# Patient Record
Sex: MALE | Race: NATIVE HAWAIIAN OR OTHER PACIFIC ISLANDER | NOT HISPANIC OR LATINO | URBAN - METROPOLITAN AREA
[De-identification: names, ages, dates, MRNs, and addresses within clinical notes are randomized per-mention and may not be internally consistent; named-entity substitution may affect disease eponyms.]

---

## 2020-03-20 ENCOUNTER — EMERGENCY (EMERGENCY)
Facility: HOSPITAL | Age: 35
LOS: 1 days | Discharge: HOME | End: 2020-03-20
Admitting: EMERGENCY MEDICINE
Payer: COMMERCIAL

## 2020-03-20 VITALS
HEART RATE: 74 BPM | TEMPERATURE: 98 F | RESPIRATION RATE: 18 BRPM | DIASTOLIC BLOOD PRESSURE: 82 MMHG | WEIGHT: 154.98 LBS | HEIGHT: 71 IN | SYSTOLIC BLOOD PRESSURE: 121 MMHG | OXYGEN SATURATION: 98 %

## 2020-03-20 DIAGNOSIS — B34.9 VIRAL INFECTION, UNSPECIFIED: ICD-10-CM

## 2020-03-20 DIAGNOSIS — R05 COUGH: ICD-10-CM

## 2020-03-20 DIAGNOSIS — Z20.828 CONTACT WITH AND (SUSPECTED) EXPOSURE TO OTHER VIRAL COMMUNICABLE DISEASES: ICD-10-CM

## 2020-03-20 LAB
RAPID RVP RESULT: DETECTED
RV+EV RNA SPEC QL NAA+PROBE: DETECTED

## 2020-03-20 PROCEDURE — 87798 DETECT AGENT NOS DNA AMP: CPT

## 2020-03-20 PROCEDURE — 99284 EMERGENCY DEPT VISIT MOD MDM: CPT

## 2020-03-20 PROCEDURE — 87486 CHLMYD PNEUM DNA AMP PROBE: CPT

## 2020-03-20 PROCEDURE — 87581 M.PNEUMON DNA AMP PROBE: CPT

## 2020-03-20 PROCEDURE — 87633 RESP VIRUS 12-25 TARGETS: CPT

## 2020-03-20 PROCEDURE — 87635 SARS-COV-2 COVID-19 AMP PRB: CPT

## 2020-03-20 NOTE — ED PROVIDER NOTE - CLINICAL SUMMARY MEDICAL DECISION MAKING FREE TEXT BOX
cough, nasal congestion, diarrhea x 5 days with COVID positive contact. pt well appearing. VSS. afebrile. covid and rvp sent. return precautions d/w pt.  self isolated and f/u with pmd

## 2020-03-20 NOTE — ED PROVIDER NOTE - PATIENT PORTAL LINK FT
You can access the FollowMyHealth Patient Portal offered by [facility] by registering at the following website: [email or web address]. By joining [employer]’s FollowMyHealth portal, you will also be able to view your health information using other applications (apps) compatible with our system.

## 2020-03-20 NOTE — ED PROVIDER NOTE - OBJECTIVE STATEMENT
35 y/o male with no sig PMHx c/o cough  x 5 days. pt states non productive cough, nasal congestion, body aches and diarrhea past 5 days. pt reports co resident recently tested positive for COVID and they were together all last week. no abd pain n/v. no ha or dizziness. no pc or sob. no further complaints.

## 2020-03-20 NOTE — ED ADULT TRIAGE NOTE - CHIEF COMPLAINT QUOTE
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  C/O of sore throat, cough, diarrhea, nausea, general malaise, subjective fevers and chills since Monday.

## 2020-03-20 NOTE — ED PROVIDER NOTE - NSFOLLOWUPINSTRUCTIONS_ED_ALL_ED_FT
call your physician in 2 days. please self isolate for the next 14 days. refer to COVID discharge instructions provided.       Log Out.    [employer]® CareNotes®     :  [facility]             UPPER RESPIRATORY INFECTION - AfterCare(R) Instructions(ER/ED)     Upper Respiratory Infection    WHAT YOU NEED TO KNOW:    An upper respiratory infection is also called the common cold. It is an infection that can affect your nose, throat, ears, and sinuses. For healthy people, the common cold is usually not serious and does not need special treatment. Cold symptoms are usually worst for the first 3 to 5 days. Most people get better in 7 to 14 days. You may continue to cough for 2 to 3 weeks. Colds are caused by viruses and do not get better with antibiotics.     DISCHARGE INSTRUCTIONS:    Return to the emergency department if:     You have chest pain or trouble breathing.        Contact your healthcare provider if:     You have a fever over 102ºF (39°C).      Your sore throat gets worse or you see white or yellow spots in your throat.      Your symptoms get worse after 3 to 5 days or your cold is not better in 14 days.      You have a rash anywhere on your skin.      You have large, tender lumps in your neck.      You have thick, green or yellow drainage from your nose.      You cough up thick yellow, green, or bloody mucus.      You have vomiting for more than 24 hours and cannot keep fluids down.      You have a bad earache.      You have questions or concerns about your condition or care.    Medicines: You may need any of the following:     Decongestants help reduce nasal congestion and help you breathe more easily. If you take decongestant pills, they may make you feel restless or cause problems with your sleep. Do not use decongestant sprays for more than a few days.      Cough suppressants help reduce coughing. Ask your healthcare provider which type of cough medicine is best for you.       NSAIDs, such as ibuprofen, help decrease swelling, pain, and fever. NSAIDs can cause stomach bleeding or kidney problems in certain people. If you take blood thinner medicine, always ask your healthcare provider if NSAIDs are safe for you. Always read the medicine label and follow directions.      Acetaminophen decreases pain and fever. It is available without a doctor's order. Ask how much to take and how often to take it. Follow directions. Read the labels of all other medicines you are using to see if they also contain acetaminophen, or ask your doctor or pharmacist. Acetaminophen can cause liver damage if not taken correctly. Do not use more than 4 grams (4,000 milligrams) total of acetaminophen in one day.       Take your medicine as directed. Contact your healthcare provider if you think your medicine is not helping or if you have side effects. Tell him or her if you are allergic to any medicine. Keep a list of the medicines, vitamins, and herbs you take. Include the amounts, and when and why you take them. Bring the list or the pill bottles to follow-up visits. Carry your medicine list with you in case of an emergency.    Follow up with your healthcare provider as directed: Write down your questions so you remember to ask them during your visits.     Self-care:     Rest as much as possible. Slowly start to do more each day.       Drink more liquids as directed. Liquids will help thin and loosen mucus so you can cough it up. Liquids will also help prevent dehydration. Liquids that help prevent dehydration include water, fruit juice, and broth. Do not drink liquids that contain caffeine. Caffeine can increase your risk for dehydration. Ask your healthcare provider how much liquid to drink each day.       Soothe a sore throat. Gargle with warm salt water. This helps your sore throat feel better. Make salt water by dissolving ¼ teaspoon salt in 1 cup warm water. You may also suck on hard candy or throat lozenges. You may use a sore throat spray.      Use a humidifier or vaporizer. Use a cool mist humidifier or a vaporizer to increase air moisture in your home. This may make it easier for you to breathe and help decrease your cough.       Use saline nasal drops as directed. These help relieve congestion.       Apply petroleum-based jelly around the outside of your nostrils. This can decrease irritation from blowing your nose.       Do not smoke. Nicotine and other chemicals in cigarettes and cigars can make your symptoms worse. They can also cause infections such as bronchitis or pneumonia. Ask your healthcare provider for information if you currently smoke and need help to quit. E-cigarettes or smokeless tobacco still contain nicotine. Talk to your healthcare provider before you use these products.     Prevent spreading your cold to others:     Try to stay away from other people during the first 2 to 3 days of your cold when it is more easily spread.       Do not share food or drinks.       Do not share hand towels with household members.      Wash your hands often, especially after you blow your nose. Turn away from other people and cover your mouth and nose with a tissue when you sneeze or cough.Handwashing              © Copyright [employer] 2020       back to top                      © Copyright [employer] 2020

## 2020-03-20 NOTE — ED ADULT NURSE NOTE - CHIEF COMPLAINT QUOTE
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  C/O of sore throat, cough, diarrhea, nausea, general malaise, subjective fevers and chills since Monday.

## 2020-03-22 LAB — SARS-COV-2 RNA SPEC QL NAA+PROBE: SIGNIFICANT CHANGE UP

## 2020-04-26 ENCOUNTER — MESSAGE (OUTPATIENT)
Age: 35
End: 2020-04-26

## 2020-05-02 PROBLEM — Z78.9 OTHER SPECIFIED HEALTH STATUS: Chronic | Status: ACTIVE | Noted: 2020-03-20

## 2020-05-04 ENCOUNTER — APPOINTMENT (OUTPATIENT)
Dept: DISASTER EMERGENCY | Facility: HOSPITAL | Age: 35
End: 2020-05-04

## 2020-05-05 LAB
SARS-COV-2 IGG SERPL IA-ACNC: <0.1 INDEX
SARS-COV-2 IGG SERPL QL IA: NEGATIVE

## 2020-05-22 ENCOUNTER — TRANSCRIPTION ENCOUNTER (OUTPATIENT)
Age: 35
End: 2020-05-22
